# Patient Record
Sex: FEMALE | Race: WHITE | NOT HISPANIC OR LATINO | URBAN - METROPOLITAN AREA
[De-identification: names, ages, dates, MRNs, and addresses within clinical notes are randomized per-mention and may not be internally consistent; named-entity substitution may affect disease eponyms.]

---

## 2018-12-05 ENCOUNTER — INPATIENT (INPATIENT)
Facility: HOSPITAL | Age: 28
LOS: 0 days | Discharge: HOME | DRG: 833 | End: 2018-12-05
Attending: OBSTETRICS & GYNECOLOGY | Admitting: OBSTETRICS & GYNECOLOGY
Payer: COMMERCIAL

## 2018-12-05 VITALS
TEMPERATURE: 98 F | OXYGEN SATURATION: 100 % | SYSTOLIC BLOOD PRESSURE: 105 MMHG | WEIGHT: 160.06 LBS | RESPIRATION RATE: 18 BRPM | HEIGHT: 67 IN | DIASTOLIC BLOOD PRESSURE: 74 MMHG | HEART RATE: 86 BPM

## 2018-12-05 PROCEDURE — 99285 EMERGENCY DEPT VISIT HI MDM: CPT

## 2018-12-05 NOTE — ED PROVIDER NOTE - OBJECTIVE STATEMENT
27 yo , 25 weeks pregnant, received bad news from a friend upstairs in this hospital and felt suddenly lightheaded, syncopal episode, no CP no SOB, no palpitation, short lived and fully recovered, occurred while sitting, no fall. no abd pain, no cramping, no discharge.

## 2018-12-05 NOTE — ED ADULT TRIAGE NOTE - CHIEF COMPLAINT QUOTE
[facility] called and pt transferred from in house s/p Syncope.  Per staff, pt was visiting a friend in the ICU who received 'bad news.'  Pt arrived to ED A&Ox3 and denies N/V/D, SOB, Fevers and CP.  .  25 weeks pregnant

## 2018-12-05 NOTE — ED PROVIDER NOTE - MEDICAL DECISION MAKING DETAILS
Patient with syncopal episode with preceding symptoms consistent w likely vasovagal syncope. Pt w no evidence for cardiac dysrthmia on EKG, no metabolic abnormalities no symptomatic orthostasis and no evidence for trauma / head injury.   Patient well appearing and fully recovered. Discussed likely diagnosis with patient. sent to [facility] for fetal check

## 2018-12-06 PROCEDURE — 82962 GLUCOSE BLOOD TEST: CPT

## 2018-12-06 PROCEDURE — 99285 EMERGENCY DEPT VISIT HI MDM: CPT

## 2018-12-06 PROCEDURE — 76818 FETAL BIOPHYS PROFILE W/NST: CPT

## 2018-12-12 DIAGNOSIS — Z3A.25 25 WEEKS GESTATION OF PREGNANCY: ICD-10-CM

## 2018-12-12 DIAGNOSIS — O26.892 OTHER SPECIFIED PREGNANCY RELATED CONDITIONS, SECOND TRIMESTER: ICD-10-CM

## 2018-12-12 DIAGNOSIS — R55 SYNCOPE AND COLLAPSE: ICD-10-CM

## 2020-06-10 NOTE — ED ADULT NURSE NOTE - CHIEF COMPLAINT QUOTE
[facility] called and pt transferred from in house s/p Syncope.  Per staff, pt was visiting a friend in the ICU who received 'bad news.'  Pt arrived to ED A&Ox3 and denies N/V/D, SOB, Fevers and CP.  .  25 weeks pregnant Detail Level: Generalized